# Patient Record
(demographics unavailable — no encounter records)

---

## 2024-12-05 NOTE — HISTORY OF PRESENT ILLNESS
[FreeTextEntry1] : 77 YO M seen 11/19/2019  with history of chronic mild LUTS for BPH and hypogonadism, For BPH takes Finasteride regularly.  PSA 1.5 (on finasteride ) from 11/19/2019, stable. Labs from 5/2019- H/H 13.5/39.9. Testosterone 401., Labs from 11/19/19: H/H 13.7/41.2. Testosterone 422., PSA 1.5.  Patient saw Myles on 12/1/2020 in my absence.  Patient seen  6/9/2021 to revisit these issues and to discuss stopping the testosterone, which he has been on for 20 years.  Patient is considering this option because of the change the significant change in cost of the medication for him with his new insurance.  He tells me that he is urinating well on the finasteride with no real urinary issues.  Urinalysis today is negative.  Residual bladder volume is 0.  Patient reports gradual loss of muscle mass and shrinking of testicles over the last 20 years of the being on testosterone replacement therapy. Patient had recent blood work June 1, 2021 which showed total testosterone of 446, PSA 1.2, H&H 13/38. PAtient STOPPED TRT at tat visit. PSA 0.34 from 11.5.2021, TT 76 from 7/9/2021.  Patient seen  11/10/2021 to reassess his response to the withdrawal of the TRT. He told me that he is feeling lass energetic off the medication. He remains on the finasteride and his urination remains good. He requested a refill of this medication.  UA NEGATIVE IPSS 11 KENNY 1 ANNA 5 We discussed the patient's symptoms off the testosterone placement therapy and the potential to resume this medication. At the present time he is looking for better insurance coverage since on his present plan is out-of-pocket expenses proximate $2000 a month and is onerous for him. We discussed the alternative to the testosterone gel he has been using for 20 years. This would be testosterone cypionate injections. I offered to order the medication administer at the first 1-2 times and teach him self injection likely at 1 cc every 2 weeks. He will consider this option as he looks more insurance alternatives. We also review the total testosterone value today to compare to that of 4 months ago. We will plan to meet follow-up in 6 months and address the testosterone issue at any time in between at his discretion. TT 60.4 PSA  0.34 (on finasteride, = 0.68).  Patient seen 5/12/2022 to repeat blood work and reassess his symptoms off TRT. He remains off TRT for 1 year and has noted a decrease in energy and libido, but is comfortable at the present level. e also describes his urination as stable and denies any gross hematuria or dysuria. Nocturia x 1 - 2. He is enjoying his 9 month old grandson. UA trace RBC lysed IPSS 9 KENNY 1 ANNA 5 PVR 14 cc.  76-year-old man with chronic low testosterone level who has now been off TRT for 1 year after having been on it for over 20 years.  He has had some decrease in libido and energy level but otherwise feels approximately the same.  Urination is stable today.  As for the microscopic hematuria identified today this is asymptomatic and I sent urine for microscopic examination culture and cytology to confirm the dipstick findings we will proceed as indicated for any abnormal findings otherwise check it on his next visit in 6 months.  This patient remains on finasteride 5 mg and we will continue on this as it is.  He has enough medication to last until his next visit or will call if he runs out before then.   UA negative C+S  negative cytology negative T 64.4 PSA 0.15 (5/12/22) (Corrected for Finasteride to 0.3)  Patient seen 11/10/2022 for follow up. Voiding symptoms are stable while on the finasteride. He denies dysuria and gross hematuria. He feels his age off the TRT but is content at this level of activity.  UA traced protein IPSS 12 KENNY 1 ANNA 5 His voiding symptoms remain stable on the finasteride 5 mg, and will continue this course, renewed today. T total was drawn today, and we will call him with the results. He will follow up in May 2023, and will have a T and PSAT level drawn prior to this visit.  TT 58 (11/10/22), 52 (5/1/23) PSA 0.10 (5/10/22), 0.10 (5/1/2023, both on finasteride. Medications remain stable and no known medication allergies.  The patient denies fevers, chills, nausea and or vomiting and no unexplained weight loss.  All pertinent parts of the patient PFSH (past medical, family and social histories), laboratory, radiological studies and physician notes were reviewed prior to starting the face to face portion of the visit. Questionnaire results were discussed with patient.  Patient seen 5/9/2023 to review recent lab work, for PABLO and reassess symptoms. He has been off TRT for almost 2 years. He continues on finasteride for his stable voiding symptoms. He reports nocturia x2. He denies gross hematuria and dysuria. UA trace RBC, protein +1 IPSS 14 KENNY 1 ANNA 4 PVR 2 cc He is doing well urologically and will continue on the finasteride for his stable voiding symptoms. Regarding the prior TRT replacement, he agrees that he will stay off it. A DEXA bone scan was ordered to evaluate any residual effects from the TRT. As for the microhematuria, a urine C+S and cytology were sent and we will call him with the results. He will follow up in 6 months and have a PSA and testosterone drawn prior to that visit. PSA 0.1 (= 0.2 due to finasteride effect) C+S, cytology negative. DEXA bone scan 6/2/2023 showed osteopenia.  Patient seen 11/8/2023 to reassess. He has had stable urination with a good stream during the day, slower at night, no dysuria, no gross hematuria, nocturia x 2-3 which he states is tolerable. He has also had a lump which he found on self exam behind his right nipple 2 weeks ago, This is under evaluation and he is going for imaging under the care of his new PCP Vern Mclaughlin. UA small bilirubin, ketone, protein. IPSS 12 AAM 5 KENNY 17 We reviewed the patient's stable lower urinary tract symptoms on finasteride only. We discussed the use of tamsulosin in an attempt to minimize his nocturia, however patient declined since he finds this symptom tolerable and would rather not take another medication on a regular basis. Blood was sent today for PSA and total testosterone and I will review these results with the patient by phone. If they remain stable then follow-up in 6 months at which time we will perform his yearly PABLO. We will also repeat the testosterone prior to his next visit and this order was written. He declined renewal of his finasteride at this time since it was just renewed by his PCP. He will contact me if he needs an renewal moving forward. He will continue on this medicine as ordered. As for the newly identified right breast mass, I encouraged him to continue with his plans for imaging and follow-up with his PCP. After the patient left, I also emailed him a copy of his DEXA scan from earlier in the year which identified osteopenia. I recommend that he share this with his new PCP also.  TT 54.1 PSA 0.1 Both stable, results to patient by phone, FU as planned 6 months.   Patient seen 5/9/2024 to reassess. PSA from 4/29/2024 stable at 0.07. He describes his LUTS as stable and remains off any TRT. As for his right breast mass, he had a negative Mammogram 11/21023 by history. He tells me that now he has masses in both breasts and had a repeat mammography 5/8/2024 with the diagnosis of gynecomastia. Results are pending. He is also seeing a specialist this week for further evaluation and treatment. UA negative IPSS 7 ANNA 5 KENNY 20 PVR 34 ml. We discussed today the patient's stable lower urinary tract symptoms and benign evaluation today. I recommended follow-up in 6 months. We will order PSA test to be drawn shortly before that visit. As for his presumptive diagnosis of gynecomastia, I encouraged him to continue his evaluation. We discussed the need to also check his hormonal axis with FSH LH prolactin estradiol and testosterone levels. These orders were placed and if his physician does not order these with her evaluation later in the week, that he will come to my office to have them done. We will follow-up in 6 months.   Patient seen TODAY 12/5/2024 to reassess.  PSA from 11/27/2024 0.12 (=0.24 on finasteride) stable. He told me that he had surgery for his gynecomastia about 1 month ago. His urination varies, but he feels as though he empties well with a reasonable stream. He has nocturia x 3 usually. He asks if he should consider restarting the testosterone, which he has been off for about 2 years now. UA negative IPSS 15 ANNA 4 KENNY 18 PVR 12ml

## 2024-12-05 NOTE — PHYSICAL EXAM
[General Appearance - In No Acute Distress] : no acute distress [Oriented To Time, Place, And Person] : oriented to person, place, and time

## 2024-12-05 NOTE — ASSESSMENT
[FreeTextEntry1] : Evaluation today indicates stable lower urinary tract symptoms on finasteride only with nocturia as the most significant complaint.  We discussed further testing in the future and when he returns in 6 months we will perform pelvic ultrasound and uroflow along with PABLO and scrotal examination.  As for the low testosterone, I recommended we assess with a morning hormone profile and this was ordered. We will discuss the results by phone to determine whether he should consider reinstitution of testosterone replacement therapy. Pippa Long MD

## 2024-12-05 NOTE — LETTER BODY
[Dear  ___] : Dear  [unfilled], [Courtesy Letter:] : I had the pleasure of seeing your patient, [unfilled], in my office today. [Please see my note below.] : Please see my note below. [Consult Closing:] : Thank you very much for allowing me to participate in the care of this patient.  If you have any questions, please do not hesitate to contact me. [FreeTextEntry3] : Best Regards,   Pippa Long MD

## 2025-06-02 NOTE — HISTORY OF PRESENT ILLNESS
[FreeTextEntry1] : 77 YO M seen 11/19/2019  with history of chronic mild LUTS for BPH and hypogonadism, For BPH takes Finasteride regularly.  PSA 1.5 (on finasteride ) from 11/19/2019, stable. Labs from 5/2019- H/H 13.5/39.9. Testosterone 401., Labs from 11/19/19: H/H 13.7/41.2. Testosterone 422., PSA 1.5.  Patient saw Myles on 12/1/2020 in my absence.  Patient seen  6/9/2021 to revisit these issues and to discuss stopping the testosterone, which he has been on for 20 years.  Patient is considering this option because of the change the significant change in cost of the medication for him with his new insurance.  He tells me that he is urinating well on the finasteride with no real urinary issues.  Urinalysis today is negative.  Residual bladder volume is 0.  Patient reports gradual loss of muscle mass and shrinking of testicles over the last 20 years of the being on testosterone replacement therapy. Patient had recent blood work June 1, 2021 which showed total testosterone of 446, PSA 1.2, H&H 13/38. PAtient STOPPED TRT at tat visit. PSA 0.34 from 11.5.2021, TT 76 from 7/9/2021.  Patient seen  11/10/2021 to reassess his response to the withdrawal of the TRT. He told me that he is feeling lass energetic off the medication. He remains on the finasteride and his urination remains good. He requested a refill of this medication.  UA NEGATIVE IPSS 11 KENNY 1 ANNA 5 We discussed the patient's symptoms off the testosterone placement therapy and the potential to resume this medication. At the present time he is looking for better insurance coverage since on his present plan is out-of-pocket expenses proximate $2000 a month and is onerous for him. We discussed the alternative to the testosterone gel he has been using for 20 years. This would be testosterone cypionate injections. I offered to order the medication administer at the first 1-2 times and teach him self injection likely at 1 cc every 2 weeks. He will consider this option as he looks more insurance alternatives. We also review the total testosterone value today to compare to that of 4 months ago. We will plan to meet follow-up in 6 months and address the testosterone issue at any time in between at his discretion. TT 60.4 PSA  0.34 (on finasteride, = 0.68).  Patient seen 5/12/2022 to repeat blood work and reassess his symptoms off TRT. He remains off TRT for 1 year and has noted a decrease in energy and libido, but is comfortable at the present level. e also describes his urination as stable and denies any gross hematuria or dysuria. Nocturia x 1 - 2. He is enjoying his 9 month old grandson. UA trace RBC lysed IPSS 9 KENNY 1 ANNA 5 PVR 14 cc.  76-year-old man with chronic low testosterone level who has now been off TRT for 1 year after having been on it for over 20 years.  He has had some decrease in libido and energy level but otherwise feels approximately the same.  Urination is stable today.  As for the microscopic hematuria identified today this is asymptomatic and I sent urine for microscopic examination culture and cytology to confirm the dipstick findings we will proceed as indicated for any abnormal findings otherwise check it on his next visit in 6 months.  This patient remains on finasteride 5 mg and we will continue on this as it is.  He has enough medication to last until his next visit or will call if he runs out before then.   UA negative C+S  negative cytology negative T 64.4 PSA 0.15 (5/12/22) (Corrected for Finasteride to 0.3)  Patient seen 11/10/2022 for follow up. Voiding symptoms are stable while on the finasteride. He denies dysuria and gross hematuria. He feels his age off the TRT but is content at this level of activity.  UA traced protein IPSS 12 KENNY 1 ANNA 5 His voiding symptoms remain stable on the finasteride 5 mg, and will continue this course, renewed today. T total was drawn today, and we will call him with the results. He will follow up in May 2023, and will have a T and PSAT level drawn prior to this visit.  TT 58 (11/10/22), 52 (5/1/23) PSA 0.10 (5/10/22), 0.10 (5/1/2023, both on finasteride. Medications remain stable and no known medication allergies.  The patient denies fevers, chills, nausea and or vomiting and no unexplained weight loss.  All pertinent parts of the patient PFSH (past medical, family and social histories), laboratory, radiological studies and physician notes were reviewed prior to starting the face to face portion of the visit. Questionnaire results were discussed with patient.  Patient seen 5/9/2023 to review recent lab work, for PABLO and reassess symptoms. He has been off TRT for almost 2 years. He continues on finasteride for his stable voiding symptoms. He reports nocturia x2. He denies gross hematuria and dysuria. UA trace RBC, protein +1 IPSS 14 KENNY 1 ANNA 4 PVR 2 cc He is doing well urologically and will continue on the finasteride for his stable voiding symptoms. Regarding the prior TRT replacement, he agrees that he will stay off it. A DEXA bone scan was ordered to evaluate any residual effects from the TRT. As for the microhematuria, a urine C+S and cytology were sent and we will call him with the results. He will follow up in 6 months and have a PSA and testosterone drawn prior to that visit. PSA 0.1 (= 0.2 due to finasteride effect) C+S, cytology negative. DEXA bone scan 6/2/2023 showed osteopenia.  Patient seen 11/8/2023 to reassess. He has had stable urination with a good stream during the day, slower at night, no dysuria, no gross hematuria, nocturia x 2-3 which he states is tolerable. He has also had a lump which he found on self exam behind his right nipple 2 weeks ago, This is under evaluation and he is going for imaging under the care of his new PCP Vern Mclaughlin. UA small bilirubin, ketone, protein. IPSS 12 AAM 5 KENNY 17 We reviewed the patient's stable lower urinary tract symptoms on finasteride only. We discussed the use of tamsulosin in an attempt to minimize his nocturia, however patient declined since he finds this symptom tolerable and would rather not take another medication on a regular basis. Blood was sent today for PSA and total testosterone and I will review these results with the patient by phone. If they remain stable then follow-up in 6 months at which time we will perform his yearly PABLO. We will also repeat the testosterone prior to his next visit and this order was written. He declined renewal of his finasteride at this time since it was just renewed by his PCP. He will contact me if he needs an renewal moving forward. He will continue on this medicine as ordered. As for the newly identified right breast mass, I encouraged him to continue with his plans for imaging and follow-up with his PCP. After the patient left, I also emailed him a copy of his DEXA scan from earlier in the year which identified osteopenia. I recommend that he share this with his new PCP also.  TT 54.1 PSA 0.1 Both stable, results to patient by phone, FU as planned 6 months.   Patient seen 5/9/2024 to reassess. PSA from 4/29/2024 stable at 0.07. He describes his LUTS as stable and remains off any TRT. As for his right breast mass, he had a negative Mammogram 11/21023 by history. He tells me that now he has masses in both breasts and had a repeat mammography 5/8/2024 with the diagnosis of gynecomastia. Results are pending. He is also seeing a specialist this week for further evaluation and treatment. UA negative IPSS 7 ANNA 5 KENNY 20 PVR 34 ml. We discussed today the patient's stable lower urinary tract symptoms and benign evaluation today. I recommended follow-up in 6 months. We will order PSA test to be drawn shortly before that visit. As for his presumptive diagnosis of gynecomastia, I encouraged him to continue his evaluation. We discussed the need to also check his hormonal axis with FSH LH prolactin estradiol and testosterone levels. These orders were placed and if his physician does not order these with her evaluation later in the week, that he will come to my office to have them done. We will follow-up in 6 months.   Patient seen 12/5/2024 to reassess.  PSA from 11/27/2024 0.12 (=0.24 on finasteride) stable. He told me that he had surgery for his gynecomastia about 1 month ago. His urination varies, but he feels as though he empties well with a reasonable stream. He has nocturia x 3 usually. He asks if he should consider restarting the testosterone, which he has been off for about 2 years now. UA negative IPSS 15 ANNA 4 KENNY 18 PVR 12ml Evaluation today indicates stable lower urinary tract symptoms on finasteride only with nocturia as the most significant complaint. We discussed further testing in the future and when he returns in 6 months we will perform pelvic ultrasound and uroflow along with PABLO and scrotal examination. As for the low testosterone, I recommended we assess with a morning hormone profile and this was ordered. We will discuss the results by phone to determine whether he should consider reinstitution of testosterone replacement therapy.  E2- 10 PRO 25.7 (4.1-18.4)  LH 81.7 TT 65.   Patient seen TODAY 6/5/2025 to reassess.

## 2025-06-06 NOTE — ASSESSMENT
[FreeTextEntry1] : Evaluation today is consistent with a stable response to finasteride and minimal lower urinary tract symptoms and medication was renewed for the patient.  While pelvic uroflowmetry was inaccurate today, he was able to empty his bladder completely as seen by pelvic ultrasound.  As for his known low testosterone levels and atrophic testes, these remain stable without any palpable masses.  Blood sent today for PSA and testosterone levels and I will review these with the patient by phone when they are ready.  If all remains stable he will be due for follow-up in 6 months and an appointment was made with my partner Dr. Jayda boggs at this office since I will be retired as of 7/1/2025. Pippa Long MD

## 2025-06-06 NOTE — PHYSICAL EXAM
[General Appearance - In No Acute Distress] : no acute distress [Edema] : no peripheral edema [] : no respiratory distress [Abdomen Soft] : soft [Testes Tenderness] : no tenderness of the testes [Testes Mass (___cm)] : there were no testicular masses [Prostate Tenderness] : the prostate was not tender [Prostate Size ___ (0-4)] : prostate size [unfilled] (scale: 0-4) [Normal Station and Gait] : the gait and station were normal for the patient's age [Skin Turgor] : supple [Oriented To Time, Place, And Person] : oriented to person, place, and time [Affect] : the affect was normal [Mood] : the mood was normal [de-identified] : Bilateral small testes.